# Patient Record
Sex: FEMALE | Race: BLACK OR AFRICAN AMERICAN | NOT HISPANIC OR LATINO | URBAN - METROPOLITAN AREA
[De-identification: names, ages, dates, MRNs, and addresses within clinical notes are randomized per-mention and may not be internally consistent; named-entity substitution may affect disease eponyms.]

---

## 2025-01-29 ENCOUNTER — APPOINTMENT (OUTPATIENT)
Dept: INTEGRATIVE MEDICINE | Facility: CLINIC | Age: 89
End: 2025-01-29
Payer: COMMERCIAL

## 2025-01-29 DIAGNOSIS — M17.9 OSTEOARTHRITIS OF KNEE: Primary | ICD-10-CM

## 2025-01-29 DIAGNOSIS — M17.0 BILATERAL PRIMARY OSTEOARTHRITIS OF KNEE: ICD-10-CM

## 2025-01-29 NOTE — PROGRESS NOTES
Acupuncture Visit:     Subjective   Patient ID: Jed Matthew is a 89 y.o. female who presents for No chief complaint on file.  Seeking support for intermittent pain occurring a couple times a day.  She works out with at  and sometimes feels it after workout.  She sustained a break in her right leg 20 + years ago and pain tends to be worse.          Session Information  Is this acupuncture treatment being billed to the patient's insurance company: Yes  This is visit number: 1  The patient has a total number of visits of: 30  Name of Insurance Company: [employer]  Visit Type: New patient         Review of Systems         Provider reviewed plan for the acupuncture session, precautions and contraindications. Patient/guardian/hospital staff has given consent to treat with full understanding of what to expect during the session. Before acupuncture began, provider explained to the patient to communicate at any time if the procedure was causing discomfort past their tolerance level. Patient agreed to advise acupuncturist. The acupuncturist counseled the patient on the risks of acupuncture treatment including pain, infection, bleeding, and no relief of pain. The patient was positioned comfortably. There was no evidence of infection at the site of needle insertions.    Objective   Physical Exam         Treatment Plan  Treatment Goals: Pain management    Acupuncture Treatment  Needle Guage: 40 guage /.16/ Red seirin, 36 guage /.20/ Blue seirin  Body Points - Bilateral: k 3, st 36, gb 34, sp 9, heding st 35, xiyan, lr 8  Other Techniques Utilized: TDP Lamp, Topicals  Topicals Description: warming lotion with palmarosa abhinav la  TDP Lamp Descripton: knees  Needle Count In: 14  Needle Count Out: 14  Needle Retention Time (min): 25 minutes  Total Face to Face Time (min): 25 minutes              Assessment/Plan

## 2025-03-06 ENCOUNTER — APPOINTMENT (OUTPATIENT)
Dept: INTEGRATIVE MEDICINE | Facility: CLINIC | Age: 89
End: 2025-03-06
Payer: COMMERCIAL

## 2025-03-13 ENCOUNTER — APPOINTMENT (OUTPATIENT)
Dept: INTEGRATIVE MEDICINE | Facility: CLINIC | Age: 89
End: 2025-03-13
Payer: COMMERCIAL

## 2025-03-13 DIAGNOSIS — M17.9 OSTEOARTHRITIS OF KNEE: Primary | ICD-10-CM

## 2025-03-13 NOTE — PROGRESS NOTES
Acupuncture Visit:     Subjective   Patient ID: Jed Matthew is a 89 y.o. female who presents for No chief complaint on file.  Pt reported that knee pain is much better.  She is able to go to the gym and work with the .  Pain is 4/10 and intermittent in her right knee and left foot, rarely she has pain in left knee.          Session Information  Is this acupuncture treatment being billed to the patient's insurance company: Yes  This is visit number: 2  The patient has a total number of visits of: 30  Visit Type: Follow-up visit         Review of Systems         Provider reviewed plan for the acupuncture session, precautions and contraindications. Patient/guardian/hospital staff has given consent to treat with full understanding of what to expect during the session. Before acupuncture began, provider explained to the patient to communicate at any time if the procedure was causing discomfort past their tolerance level. Patient agreed to advise acupuncturist. The acupuncturist counseled the patient on the risks of acupuncture treatment including pain, infection, bleeding, and no relief of pain. The patient was positioned comfortably. There was no evidence of infection at the site of needle insertions.    Objective   Physical Exam              Acupuncture Treatment  Needle Guage: 40 guage /.16/ Red seirin, 36 guage /.20/ Blue seirin  Body Points - Left: gb 40, sj 9, li 4  Body Points - Bilateral: k 6  Body Points - Right: lr 3, st 36, lr 8, sp 9, 10, heding xiyan, st 35, gb 34 cv 6  Other Techniques Utilized: TDP Lamp, Topicals  Topicals Description: warming lotion with palmarosa abhinav la  TDP Lamp Descripton: knees  Needle Count In: 15  Needle Count Out: 15  Needle Retention Time (min): 25 minutes  Total Face to Face Time (min): 25 minutes              Assessment/Plan

## 2025-03-20 ENCOUNTER — APPOINTMENT (OUTPATIENT)
Dept: INTEGRATIVE MEDICINE | Facility: CLINIC | Age: 89
End: 2025-03-20
Payer: COMMERCIAL

## 2025-03-20 DIAGNOSIS — M17.9 OSTEOARTHRITIS OF KNEE: Primary | ICD-10-CM

## 2025-03-20 DIAGNOSIS — M17.0 OSTEOARTHRITIS OF BOTH KNEES, UNSPECIFIED OSTEOARTHRITIS TYPE: ICD-10-CM

## 2025-03-20 NOTE — PROGRESS NOTES
Acupuncture Visit:     Subjective   Patient ID: Jed Matthew is a 89 y.o. female who presents for No chief complaint on file.  Pt reported that left knee has been feeling good right knee is 3/10 pain        Session Information  Is this acupuncture treatment being billed to the patient's insurance company: Yes  This is visit number: 3  The patient has a total number of visits of: 30  Name of Insurance Company: [employer]  Visit Type: Follow-up visit         Review of Systems         Provider reviewed plan for the acupuncture session, precautions and contraindications. Patient/guardian/hospital staff has given consent to treat with full understanding of what to expect during the session. Before acupuncture began, provider explained to the patient to communicate at any time if the procedure was causing discomfort past their tolerance level. Patient agreed to advise acupuncturist. The acupuncturist counseled the patient on the risks of acupuncture treatment including pain, infection, bleeding, and no relief of pain. The patient was positioned comfortably. There was no evidence of infection at the site of needle insertions.    Objective   Physical Exam         Treatment Plan  Treatment Goals: Pain management    Acupuncture Treatment  Patient Position: Supine on a table  Needle Guage: 40 guage /.16/ Red seirin, 36 guage /.20/ Blue seirin  Body Points - Bilateral: sp 6, 9, lr8, xiyan, st 35, heding gb 34, st 36  TDP Lamp Descripton: knees  Needle Count In: 16  Needle Count Out: 16  Needle Retention Time (min): 25 minutes  Total Face to Face Time (min): 25 minutes              Assessment/Plan

## 2025-03-31 ENCOUNTER — APPOINTMENT (OUTPATIENT)
Dept: INTEGRATIVE MEDICINE | Facility: CLINIC | Age: 89
End: 2025-03-31
Payer: COMMERCIAL

## 2025-03-31 DIAGNOSIS — M17.0 OSTEOARTHRITIS OF BOTH KNEES, UNSPECIFIED OSTEOARTHRITIS TYPE: Primary | ICD-10-CM

## 2025-03-31 NOTE — PROGRESS NOTES
Acupuncture Visit:     Subjective   Patient ID: Jed Matthew is a 89 y.o. female who presents for No chief complaint on file.  Pt reported that she feels fantastic.  She was in [place] for a water conference and has been working in garden more.  Seeking continued support for bilateral knee pain.          Session Information  Is this acupuncture treatment being billed to the patient's insurance company: Yes  This is visit number: 4  The patient has a total number of visits of: 30  Name of Insurance Company: [employer]  Visit Type: Follow-up visit         Review of Systems         Provider reviewed plan for the acupuncture session, precautions and contraindications. Patient/guardian/hospital staff has given consent to treat with full understanding of what to expect during the session. Before acupuncture began, provider explained to the patient to communicate at any time if the procedure was causing discomfort past their tolerance level. Patient agreed to advise acupuncturist. The acupuncturist counseled the patient on the risks of acupuncture treatment including pain, infection, bleeding, and no relief of pain. The patient was positioned comfortably. There was no evidence of infection at the site of needle insertions.    Objective   Physical Exam         Treatment Plan  Treatment Goals: Pain management    Acupuncture Treatment  Patient Position: Supine on a table  Needle Guage: 40 guage /.16/ Red seirin, 36 guage /.20/ Blue seirin  Body Points - Bilateral: sp 6, 9, lr8, xiyan, st 35, heding gb 34, st 36, k 6,  TDP Lamp Descripton: knees  Needle Count In: 18  Needle Count Out: 18              Assessment/Plan

## 2025-05-01 ENCOUNTER — APPOINTMENT (OUTPATIENT)
Dept: INTEGRATIVE MEDICINE | Facility: CLINIC | Age: 89
End: 2025-05-01
Payer: COMMERCIAL

## 2025-05-12 ENCOUNTER — APPOINTMENT (OUTPATIENT)
Dept: INTEGRATIVE MEDICINE | Facility: CLINIC | Age: 89
End: 2025-05-12
Payer: COMMERCIAL

## 2025-05-29 ENCOUNTER — APPOINTMENT (OUTPATIENT)
Dept: INTEGRATIVE MEDICINE | Facility: CLINIC | Age: 89
End: 2025-05-29
Payer: COMMERCIAL

## 2025-05-29 DIAGNOSIS — M17.0 OSTEOARTHRITIS OF BOTH KNEES, UNSPECIFIED OSTEOARTHRITIS TYPE: Primary | ICD-10-CM

## 2025-05-29 NOTE — PROGRESS NOTES
Acupuncture Visit:     Subjective   Patient ID: Jed Matthew is a 89 y.o. female who presents for No chief complaint on file.  Seeking continued support for knee pain.  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  Left knee is only problematic when she bends in certain ways in garden for sustained periods. Right knee inflammation is reduced.        Session Information  Is this acupuncture treatment being billed to the patient's insurance company: Yes  This is visit number: 5  The patient has a total number of visits of: 30  Name of Insurance Company: [employer]  Visit Type: Follow-up visit         Review of Systems         Provider reviewed plan for the acupuncture session, precautions and contraindications. Patient/guardian/hospital staff has given consent to treat with full understanding of what to expect during the session. Before acupuncture began, provider explained to the patient to communicate at any time if the procedure was causing discomfort past their tolerance level. Patient agreed to advise acupuncturist. The acupuncturist counseled the patient on the risks of acupuncture treatment including pain, infection, bleeding, and no relief of pain. The patient was positioned comfortably. There was no evidence of infection at the site of needle insertions.    Objective   Physical Exam         Treatment Plan  Treatment Goals: Pain management    Acupuncture Treatment  Patient Position: Supine on a table  Needle Guage: 40 guage /.16/ Red seirin, 36 guage /.20/ Blue seirin  Body Points - Bilateral: sp 6  Body Points - Right: sp 9, lr 8, heding, xiyan, st 35, 36, gb 34  Other Techniques Utilized: TDP Lamp, Topicals  Topicals Description: warming lotion with palmarosa abhinav la  TDP Lamp Descripton: knees  Needle Count In: 9  Needle Count Out: 9  Needle Retention Time (min): 30 minutes  Total Face to Face Time (min): 25 minutes               Assessment/Plan

## 2025-06-09 ENCOUNTER — APPOINTMENT (OUTPATIENT)
Dept: INTEGRATIVE MEDICINE | Facility: CLINIC | Age: 89
End: 2025-06-09
Payer: COMMERCIAL

## 2025-06-30 ENCOUNTER — APPOINTMENT (OUTPATIENT)
Dept: INTEGRATIVE MEDICINE | Facility: CLINIC | Age: 89
End: 2025-06-30
Payer: COMMERCIAL

## 2025-07-09 ENCOUNTER — APPOINTMENT (OUTPATIENT)
Dept: INTEGRATIVE MEDICINE | Facility: CLINIC | Age: 89
End: 2025-07-09
Payer: COMMERCIAL

## 2025-07-09 DIAGNOSIS — M17.0 OSTEOARTHRITIS OF BOTH KNEES, UNSPECIFIED OSTEOARTHRITIS TYPE: Primary | ICD-10-CM

## 2025-07-09 PROCEDURE — 97811 ACUP 1/> W/O ESTIM EA ADD 15: CPT | Performed by: ACUPUNCTURIST

## 2025-07-09 PROCEDURE — 97810 ACUP 1/> WO ESTIM 1ST 15 MIN: CPT | Performed by: ACUPUNCTURIST

## 2025-07-09 NOTE — PROGRESS NOTES
Acupuncture Visit:     Subjective   Patient ID: Jed Matthew is a 89 y.o. female who presents for No chief complaint on file.  Pt reported that her knees are doing well.  She has been able to increase leg work with  and be able to vigorously garden.  Right knee a little more uncomfortable than left.          Session Information  Is this acupuncture treatment being billed to the patient's insurance company: Yes  This is visit number: 6  The patient has a total number of visits of: 30  Name of Insurance Company: [employer]         Review of Systems         Provider reviewed plan for the acupuncture session, precautions and contraindications. Patient/guardian/hospital staff has given consent to treat with full understanding of what to expect during the session. Before acupuncture began, provider explained to the patient to communicate at any time if the procedure was causing discomfort past their tolerance level. Patient agreed to advise acupuncturist. The acupuncturist counseled the patient on the risks of acupuncture treatment including pain, infection, bleeding, and no relief of pain. The patient was positioned comfortably. There was no evidence of infection at the site of needle insertions.    Objective   Physical Exam         Treatment Plan  Treatment Goals: Pain management    Acupuncture Treatment  Patient Position: Supine on a table  Needle Guage: 36 guage /.20/ Blue seirin  Body Points: With retention  Body Points - Bilateral: st 36, gb34, lr4, k 3, sp 9, 10, gb 33, heding, xiyan st 35  Other Techniques Utilized: TDP Lamp, Topicals  Topicals Description: warming lotion with palmarosa abhinav la  TDP Lamp Descripton: knees  Needle Count In: 20  Needle Count Out: 20  Needle Retention Time (min): 30 minutes  Total Face to Face Time (min): 25 minutes              Assessment/Plan

## 2025-07-23 ENCOUNTER — APPOINTMENT (OUTPATIENT)
Dept: INTEGRATIVE MEDICINE | Facility: CLINIC | Age: 89
End: 2025-07-23
Payer: COMMERCIAL

## 2025-07-23 DIAGNOSIS — M17.0 OSTEOARTHRITIS OF BOTH KNEES, UNSPECIFIED OSTEOARTHRITIS TYPE: Primary | ICD-10-CM

## 2025-07-23 PROCEDURE — 97810 ACUP 1/> WO ESTIM 1ST 15 MIN: CPT | Performed by: ACUPUNCTURIST

## 2025-07-23 PROCEDURE — 97811 ACUP 1/> W/O ESTIM EA ADD 15: CPT | Performed by: ACUPUNCTURIST

## 2025-07-23 NOTE — PROGRESS NOTES
Acupuncture Visit:     Subjective   Patient ID: Jed Matthew is a 89 y.o. female who presents for No chief complaint on file.  Knees are improving.  She has been out gardening and doing chores all day.  She is experiencing some soreness in right knee with intermittent sharp pain that resolved quickly.          Session Information  Is this acupuncture treatment being billed to the patient's insurance company: Yes  This is visit number: 7  The patient has a total number of visits of: 30  Name of Insurance Company: [employer]         Review of Systems         Provider reviewed plan for the acupuncture session, precautions and contraindications. Patient/guardian/hospital staff has given consent to treat with full understanding of what to expect during the session. Before acupuncture began, provider explained to the patient to communicate at any time if the procedure was causing discomfort past their tolerance level. Patient agreed to advise acupuncturist. The acupuncturist counseled the patient on the risks of acupuncture treatment including pain, infection, bleeding, and no relief of pain. The patient was positioned comfortably. There was no evidence of infection at the site of needle insertions.    Objective   Physical Exam         Treatment Plan  Treatment Goals: Pain management    Acupuncture Treatment  Patient Position: Supine on a table  Needle Guage: 40 guage /.16/ Red seirin, 36 guage /.20/ Blue seirin  Body Points - Bilateral: k 7, 10, sp 9, gb34, heding, xiyan, st 35, sp 10  Other Techniques Utilized: TDP Lamp, Topicals  Topicals Description: warming lotion with palmarosa abhinav la  TDP Lamp Descripton: knees  Needle Count In: 16  Needle Count Out: 16  Needle Retention Time (min): 25 minutes  Total Face to Face Time (min): 25 minutes              Assessment/Plan

## 2025-08-07 ENCOUNTER — APPOINTMENT (OUTPATIENT)
Dept: INTEGRATIVE MEDICINE | Facility: CLINIC | Age: 89
End: 2025-08-07
Payer: COMMERCIAL

## 2025-08-11 ENCOUNTER — APPOINTMENT (OUTPATIENT)
Dept: INTEGRATIVE MEDICINE | Facility: CLINIC | Age: 89
End: 2025-08-11
Payer: COMMERCIAL

## 2025-08-21 ENCOUNTER — APPOINTMENT (OUTPATIENT)
Dept: INTEGRATIVE MEDICINE | Facility: CLINIC | Age: 89
End: 2025-08-21
Payer: COMMERCIAL

## 2025-08-25 ENCOUNTER — APPOINTMENT (OUTPATIENT)
Dept: INTEGRATIVE MEDICINE | Facility: CLINIC | Age: 89
End: 2025-08-25
Payer: COMMERCIAL

## 2025-08-25 DIAGNOSIS — M17.0 BILATERAL PRIMARY OSTEOARTHRITIS OF KNEE: Primary | ICD-10-CM

## 2025-08-25 PROCEDURE — 97810 ACUP 1/> WO ESTIM 1ST 15 MIN: CPT | Performed by: ACUPUNCTURIST

## 2025-08-25 PROCEDURE — 97811 ACUP 1/> W/O ESTIM EA ADD 15: CPT | Performed by: ACUPUNCTURIST

## 2025-09-03 ENCOUNTER — APPOINTMENT (OUTPATIENT)
Dept: INTEGRATIVE MEDICINE | Facility: CLINIC | Age: 89
End: 2025-09-03
Payer: COMMERCIAL

## 2025-09-10 ENCOUNTER — APPOINTMENT (OUTPATIENT)
Dept: INTEGRATIVE MEDICINE | Facility: CLINIC | Age: 89
End: 2025-09-10
Payer: COMMERCIAL

## 2025-09-17 ENCOUNTER — APPOINTMENT (OUTPATIENT)
Dept: INTEGRATIVE MEDICINE | Facility: CLINIC | Age: 89
End: 2025-09-17
Payer: COMMERCIAL

## 2025-09-22 ENCOUNTER — APPOINTMENT (OUTPATIENT)
Dept: INTEGRATIVE MEDICINE | Facility: CLINIC | Age: 89
End: 2025-09-22
Payer: COMMERCIAL

## 2025-09-29 ENCOUNTER — APPOINTMENT (OUTPATIENT)
Dept: INTEGRATIVE MEDICINE | Facility: CLINIC | Age: 89
End: 2025-09-29
Payer: COMMERCIAL

## 2025-10-06 ENCOUNTER — APPOINTMENT (OUTPATIENT)
Dept: INTEGRATIVE MEDICINE | Facility: CLINIC | Age: 89
End: 2025-10-06
Payer: COMMERCIAL

## 2025-10-15 ENCOUNTER — APPOINTMENT (OUTPATIENT)
Dept: INTEGRATIVE MEDICINE | Facility: CLINIC | Age: 89
End: 2025-10-15
Payer: COMMERCIAL